# Patient Record
(demographics unavailable — no encounter records)

---

## 2025-05-29 NOTE — PHYSICAL EXAM
[JVD] : no jugular venous distention  [Respiratory Effort] : normal respiratory effort [Normal Rate and Rhythm] : normal rate and rhythm [Abdominal Aorta] : Normal abdominal aorta [Femoral Arteries] : Normal femoral pulses [2+] : left 2+ [Ankle Swelling (On Exam)] : not present [Ankle Swelling Bilaterally] : bilaterally  [Alert] : alert [Calm] : calm [de-identified] : awake, alert [FreeTextEntry1] : prominent aortoiliac pulse, somewhat prominent pop pulse feet warm, well perfused [de-identified] : no gross deficits

## 2025-05-29 NOTE — HISTORY OF PRESENT ILLNESS
[FreeTextEntry1] : 77M, incidental finding of 3 cm iliac aneurysm.  no known prior hx of aneurysmal disease.  no fam hx.  He is typically active, able to walk many blocks without shortness of breath. no prior abd surgeries + smoking +DM, + hld  no known allergies

## 2025-05-29 NOTE — ASSESSMENT
[FreeTextEntry1] : prior duplex shows 3 cm R MARIA LUISA aneurysm  CTA is warranted to better assess anatomy, suitability for repair.  general rec is repair at 3 cm, assuming good surgical candidate. will also do cta w runoff to assess for concomitant pop Aneurysm plan med/card clearance extensive discussion re aortoiliac aneurysmal disease, including natural hx, risk rupture, risk/benefits of repair.  tentative plan for EVAR

## 2025-06-04 NOTE — HISTORY OF PRESENT ILLNESS
[Current] : Current [TextBox_13] : Patient is scheduled for a baseline LDCT for lung cancer screening.  Attempts to reach patient unsuccessful. Chart review performed to confirm eligibility for LDCT.    No documented personal or family history of lung cancer. No documented s/s of lung cancer. Pt is a current smoker with a 20 pack year hx (smoking for 55 years) [PacksperYear] : 20